# Patient Record
Sex: FEMALE | Race: WHITE | NOT HISPANIC OR LATINO | RURAL
[De-identification: names, ages, dates, MRNs, and addresses within clinical notes are randomized per-mention and may not be internally consistent; named-entity substitution may affect disease eponyms.]

---

## 2024-05-04 ENCOUNTER — OFFICE VISIT (OUTPATIENT)
Dept: FAMILY MEDICINE | Facility: CLINIC | Age: 49
End: 2024-05-04
Payer: COMMERCIAL

## 2024-05-04 VITALS
WEIGHT: 156 LBS | SYSTOLIC BLOOD PRESSURE: 155 MMHG | DIASTOLIC BLOOD PRESSURE: 89 MMHG | HEART RATE: 77 BPM | TEMPERATURE: 98 F | BODY MASS INDEX: 23.64 KG/M2 | HEIGHT: 68 IN | OXYGEN SATURATION: 97 % | RESPIRATION RATE: 20 BRPM

## 2024-05-04 DIAGNOSIS — L23.7 CONTACT DERMATITIS DUE TO POISON IVY: Primary | ICD-10-CM

## 2024-05-04 PROBLEM — H00.019 HORDEOLUM EXTERNUM: Status: ACTIVE | Noted: 2024-05-04

## 2024-05-04 PROBLEM — Q82.6 CONGENITAL SACRAL DIMPLE: Status: ACTIVE | Noted: 2024-05-04

## 2024-05-04 PROBLEM — N76.0 BACTERIAL VAGINOSIS: Status: ACTIVE | Noted: 2024-05-04

## 2024-05-04 PROBLEM — R35.0 INCREASED FREQUENCY OF URINATION: Status: ACTIVE | Noted: 2024-05-04

## 2024-05-04 PROBLEM — N92.0 MENORRHAGIA: Status: ACTIVE | Noted: 2024-05-04

## 2024-05-04 PROBLEM — Z30.9 CONTRACEPTIVE MANAGEMENT: Status: ACTIVE | Noted: 2024-05-04

## 2024-05-04 PROBLEM — B96.89 BACTERIAL VAGINOSIS: Status: ACTIVE | Noted: 2024-05-04

## 2024-05-04 PROBLEM — Z34.80 ENCOUNTER FOR SUPERVISION OF NORMAL PREGNANCY IN MULTIGRAVIDA: Status: ACTIVE | Noted: 2024-05-04

## 2024-05-04 PROBLEM — N94.6 DYSMENORRHEA: Status: ACTIVE | Noted: 2024-05-04

## 2024-05-04 PROBLEM — R87.612 LOW GRADE SQUAMOUS INTRAEPITHELIAL CERVICAL DYSPLASIA AFFECTING PREGNANCY, ANTEPARTUM: Status: ACTIVE | Noted: 2024-05-04

## 2024-05-04 PROBLEM — F32.9 MAJOR DEPRESSION, SINGLE EPISODE: Status: ACTIVE | Noted: 2024-05-04

## 2024-05-04 PROBLEM — K59.00 CONSTIPATION: Status: ACTIVE | Noted: 2024-05-04

## 2024-05-04 PROBLEM — H66.90 OTITIS MEDIA: Status: ACTIVE | Noted: 2024-05-04

## 2024-05-04 PROBLEM — R30.0 DYSURIA: Status: ACTIVE | Noted: 2024-05-04

## 2024-05-04 PROBLEM — H90.11 CONDUCTIVE HEARING LOSS OF RIGHT EAR: Status: ACTIVE | Noted: 2024-05-04

## 2024-05-04 PROBLEM — Z01.89 RADIOLOGICAL EXAMINATION, NOT ELSEWHERE CLASSIFIED: Status: ACTIVE | Noted: 2024-05-04

## 2024-05-04 PROBLEM — F17.210 CIGARETTE SMOKER: Status: ACTIVE | Noted: 2024-05-04

## 2024-05-04 PROBLEM — G44.89 HEADACHE SYNDROME: Status: ACTIVE | Noted: 2024-05-04

## 2024-05-04 PROBLEM — H69.90 EUSTACHIAN TUBE DYSFUNCTION: Status: ACTIVE | Noted: 2024-05-04

## 2024-05-04 PROBLEM — R53.83 FATIGUE: Status: ACTIVE | Noted: 2024-05-04

## 2024-05-04 PROBLEM — M62.830 SPASM OF BACK MUSCLES: Status: ACTIVE | Noted: 2024-05-04

## 2024-05-04 PROBLEM — N92.6 MENSTRUAL DISORDER: Status: ACTIVE | Noted: 2024-05-04

## 2024-05-04 PROBLEM — H53.9 DISORDER OF VISION: Status: ACTIVE | Noted: 2024-05-04

## 2024-05-04 PROBLEM — F17.200 NICOTINE DEPENDENCE: Status: ACTIVE | Noted: 2024-05-04

## 2024-05-04 PROBLEM — D64.9 ANEMIA: Status: ACTIVE | Noted: 2024-05-04

## 2024-05-04 PROBLEM — N89.8 LEUKORRHEA: Status: ACTIVE | Noted: 2024-05-04

## 2024-05-04 PROBLEM — N76.0 VAGINITIS: Status: ACTIVE | Noted: 2024-05-04

## 2024-05-04 PROBLEM — Z04.9 OBSERVATION FOR SUSPECTED CONDITION: Status: ACTIVE | Noted: 2024-05-04

## 2024-05-04 PROBLEM — S60.219A CONTUSION OF WRIST: Status: ACTIVE | Noted: 2024-05-04

## 2024-05-04 PROBLEM — N93.8 DYSFUNCTIONAL UTERINE BLEEDING: Status: ACTIVE | Noted: 2024-05-04

## 2024-05-04 PROBLEM — Z12.4 SCREENING FOR MALIGNANT NEOPLASM OF CERVIX: Status: ACTIVE | Noted: 2024-05-04

## 2024-05-04 PROBLEM — Z00.00 ROUTINE GENERAL MEDICAL EXAMINATION AT A HEALTH CARE FACILITY: Status: ACTIVE | Noted: 2024-05-04

## 2024-05-04 PROBLEM — N92.1 MENOMETRORRHAGIA: Status: ACTIVE | Noted: 2024-05-04

## 2024-05-04 PROBLEM — N89.8 VAGINAL DISCHARGE: Status: ACTIVE | Noted: 2024-05-04

## 2024-05-04 PROBLEM — M25.559 ARTHRALGIA OF HIP: Status: ACTIVE | Noted: 2024-05-04

## 2024-05-04 PROBLEM — O34.40 LOW GRADE SQUAMOUS INTRAEPITHELIAL CERVICAL DYSPLASIA AFFECTING PREGNANCY, ANTEPARTUM: Status: ACTIVE | Noted: 2024-05-04

## 2024-05-04 PROBLEM — F41.9 ANXIETY DISORDER: Status: ACTIVE | Noted: 2024-05-04

## 2024-05-04 PROBLEM — Z02.9 ENCOUNTERS FOR ADMINISTRATIVE PURPOSE: Status: ACTIVE | Noted: 2024-05-04

## 2024-05-04 PROBLEM — J30.2 SEASONAL ALLERGIC RHINITIS: Status: ACTIVE | Noted: 2024-05-04

## 2024-05-04 PROBLEM — R20.2 PINS AND NEEDLES SENSATION: Status: ACTIVE | Noted: 2024-05-04

## 2024-05-04 PROBLEM — N92.6 IRREGULAR MENSTRUAL CYCLE: Status: ACTIVE | Noted: 2024-05-04

## 2024-05-04 PROBLEM — Z20.822 SEVERE ACUTE RESPIRATORY SYNDROME CORONAVIRUS 2 (SARS-COV-2) TEST RESULT UNKNOWN: Status: ACTIVE | Noted: 2024-05-04

## 2024-05-04 PROBLEM — L70.0 ACNE VULGARIS: Status: ACTIVE | Noted: 2024-05-04

## 2024-05-04 PROBLEM — R12 HEARTBURN: Status: ACTIVE | Noted: 2024-05-04

## 2024-05-04 PROBLEM — R10.2 FEMALE PELVIC PAIN: Status: ACTIVE | Noted: 2024-05-04

## 2024-05-04 PROBLEM — O36.8190 DECREASED FETAL MOVEMENT DURING PREGNANCY: Status: ACTIVE | Noted: 2024-05-04

## 2024-05-04 PROCEDURE — 1159F MED LIST DOCD IN RCRD: CPT | Mod: ,,, | Performed by: NURSE PRACTITIONER

## 2024-05-04 PROCEDURE — 96372 THER/PROPH/DIAG INJ SC/IM: CPT | Mod: ,,, | Performed by: NURSE PRACTITIONER

## 2024-05-04 PROCEDURE — 99203 OFFICE O/P NEW LOW 30 MIN: CPT | Mod: 25,,, | Performed by: NURSE PRACTITIONER

## 2024-05-04 PROCEDURE — 3079F DIAST BP 80-89 MM HG: CPT | Mod: ,,, | Performed by: NURSE PRACTITIONER

## 2024-05-04 PROCEDURE — 1160F RVW MEDS BY RX/DR IN RCRD: CPT | Mod: ,,, | Performed by: NURSE PRACTITIONER

## 2024-05-04 PROCEDURE — 99051 MED SERV EVE/WKEND/HOLIDAY: CPT | Mod: ,,, | Performed by: NURSE PRACTITIONER

## 2024-05-04 PROCEDURE — 3077F SYST BP >= 140 MM HG: CPT | Mod: ,,, | Performed by: NURSE PRACTITIONER

## 2024-05-04 PROCEDURE — 3008F BODY MASS INDEX DOCD: CPT | Mod: ,,, | Performed by: NURSE PRACTITIONER

## 2024-05-04 RX ORDER — DEXAMETHASONE SODIUM PHOSPHATE 4 MG/ML
4 INJECTION, SOLUTION INTRA-ARTICULAR; INTRALESIONAL; INTRAMUSCULAR; INTRAVENOUS; SOFT TISSUE
Status: COMPLETED | OUTPATIENT
Start: 2024-05-04 | End: 2024-05-04

## 2024-05-04 RX ORDER — TRIAMCINOLONE ACETONIDE 1 MG/G
OINTMENT TOPICAL 2 TIMES DAILY PRN
Qty: 80 G | Refills: 0 | Status: SHIPPED | OUTPATIENT
Start: 2024-05-04

## 2024-05-04 RX ORDER — PREDNISONE 10 MG/1
20 TABLET ORAL DAILY
Qty: 10 TABLET | Refills: 0 | Status: SHIPPED | OUTPATIENT
Start: 2024-05-04 | End: 2024-05-09

## 2024-05-04 RX ADMIN — DEXAMETHASONE SODIUM PHOSPHATE 4 MG: 4 INJECTION, SOLUTION INTRA-ARTICULAR; INTRALESIONAL; INTRAMUSCULAR; INTRAVENOUS; SOFT TISSUE at 05:05

## 2024-05-04 NOTE — PROGRESS NOTES
12/06/23        Claudette Johnson  [address] [facility] [address]        Dear Claudette Johnson      Here are your mammogram results. Your mammogram is normal and you should continue with routine annual screening. Please let me know if you have any further questions or concerns.     Take care,  Dr. Medina    Resulted Orders   MAMMO SCREENING BILATERAL W [place]    Narrative    EXAM: MAMMO SCREENING BILATERAL W [place]    DATE OF EXAM: 11/27/2023 2:40 PM.    COMPARISON EXAMS: Prior studies with the most recent performed on  11/23/2022.    CLINICAL INDICATION: Screening.    TECHNIQUE: Bilateral digital screening mammogram with 2D and   tomosynthesis.  Computer-Aided Detection was utilized.    DENSITY: The breasts are heterogeneously dense, which may obscure small  masses.    FINDINGS: No suspicious asymmetries or groups of microcalcifications.  Benign-appearing punctate microcalcification in the left breast.        Impression     No mammographic evidence for malignancy.     RECOMMENDATION: Routine mammographic screening according to [employer]  [employer] recommendations.      BI-RADS Category 2: Benign.    In compliance with federal regulations, the patient will be sent a lay  summary result of this report.    Electronically Signed by: Juan Alberto Rojas MD  Signed on: 11/28/2023 9:08 AM  Created on Workstation ID: [id number]  Signed on Workstation ID: [id number]             If you have any questions about your test results, please call Dr. Medina's office, Monday through Friday, 10:00 am. - 3:00 pm. at [phone].    Thank you.     [address] [place]  [address]       Tolerated well.

## 2024-05-04 NOTE — LETTER
May 4, 2024    Kathryn Baltazar  [address] [address]             [facility]  Family Medicine  [address] [place] [address] [place] [address]  Phone: [phone]  Fax: [phone]   May 4, 2024     Patient: Kathryn Baltazar   YOB: 1975   Date of Visit: 5/4/2024       To Whom it May Concern:    Kathryn Baltazar was seen in my clinic on 5/4/2024. She may return to work on 05/05/2024 .    Please excuse her from any work missed.    If you have any questions or concerns, please don't hesitate to call.    Sincerely,         Christine Chaudhari CMA

## 2024-05-05 NOTE — PROGRESS NOTES
"Subjective:       Patient ID: Kathryn Baltazar is a 48 y.o. female.    Chief Complaint: Insect Bite (Patient thinks she has spider bite on Right forearm, painful and burning)    Presents to clinic with itchy rash on right forearm. Has been exposed to poison ivy.       Review of Systems   Constitutional: Negative.    Respiratory: Negative.     Cardiovascular: Negative.    Skin:  Positive for itching and rash.          Reviewed family, medical, surgical, and social history.    Objective:      BP (!) 155/89 (BP Location: Right arm, Patient Position: Sitting, BP Method: Large (Automatic))   Pulse 77   Temp 98 °F (36.7 °C) (Oral)   Resp 20   Ht 5' 8" (1.727 m)   Wt 70.8 kg (156 lb)   LMP  (LMP Unknown)   SpO2 97%   BMI 23.72 kg/m²   Physical Exam  Vitals and nursing note reviewed.   Constitutional:       General: She is not in acute distress.     Appearance: Normal appearance. She is not ill-appearing, toxic-appearing or diaphoretic.   HENT:      Head: Normocephalic.      Mouth/Throat:      Mouth: Mucous membranes are moist.   Cardiovascular:      Rate and Rhythm: Normal rate and regular rhythm.      Heart sounds: Normal heart sounds.   Pulmonary:      Effort: Pulmonary effort is normal.      Breath sounds: Normal breath sounds.   Musculoskeletal:      Cervical back: Normal range of motion and neck supple.   Skin:     General: Skin is warm and dry.      Capillary Refill: Capillary refill takes less than 2 seconds.      Findings: Rash present.      Comments: Vesicular rash with linear distribution on right FA. No scabbing or exudate.   Neurological:      Mental Status: She is alert and oriented to person, place, and time.   Psychiatric:         Mood and Affect: Mood normal.         Behavior: Behavior normal.         Thought Content: Thought content normal.         Judgment: Judgment normal.            No results found for any previous visit.      Assessment:       1. Contact dermatitis due to poison ivy        Plan: "       Contact dermatitis due to poison ivy  -     dexAMETHasone injection 4 mg  -     predniSONE (DELTASONE) 10 MG tablet; Take 2 tablets (20 mg total) by mouth once daily. Start on 5/5/24 for 5 days  Dispense: 10 tablet; Refill: 0  -     triamcinolone acetonide 0.1% (KENALOG) 0.1 % ointment; Apply topically 2 (two) times daily as needed (itching).  Dispense: 80 g; Refill: 0    RTC PRN          Risks, benefits, and side effects were discussed with the patient. All questions were answered to the fullest satisfaction of the patient, and pt verbalized understanding and agreement to treatment plan. Pt was to call with any new or worsening symptoms, or present to the ER.